# Patient Record
(demographics unavailable — no encounter records)

---

## 2025-01-09 NOTE — PROCEDURE
[FreeTextEntry1] : Plan:  Examination.  (Yr=[id number]).  We had a lengthy discussion concerning the patient's condition.  Surgical debride the affected skin area. Lotion was applied to the affected areas and is to be applied daily.  Fungal nails were debrided using manual cutters and electrical  to patient tolerance. (Oc=[id number]).  We discussed diabetic precautions. Patient to return: 3 months

## 2025-01-09 NOTE — HISTORY OF PRESENT ILLNESS
[FreeTextEntry1] : True is a 63-year-old male who presents to our office this morning for continued diabetic foot care.  He complains of thick, hard, dry, scaly skin, and toenails that are difficult to cut. The patient is a diabetic.  He is doing much better.  His last A1C was 6.9.

## 2025-01-09 NOTE — REVIEW OF SYSTEMS
[Negative] : Constitutional [de-identified] : dry skin, thick toenails [de-identified] : neuropathy

## 2025-01-09 NOTE — PHYSICAL EXAM
[FreeTextEntry3] : Vascular Exam: DP Pulse (left): 1/4. DP Pulse (right): 1/4. PT Pulse (left): 1/4. PT Pulse (right): 1/4. Capillary Return - L: Instantaneous. Capillary Return - R: Instantaneous. Temperature Grad - L: Hot to Warm. Temperature Grad - R: Hot to Warm. [de-identified] : Orthopedic Exam: Patient presents with nonpainful bunion and hammertoe deformities. [FreeTextEntry1] : Neurological Exam:  Achilles reflex absent, Patella reflex absent, sharp dull absent, light touch/pressure absent, hot/cold absent, vibratory absent, [place]-Kristin hallux, 5th digit, 3rd met head, heel absent.

## 2025-05-22 NOTE — PHYSICAL EXAM
[General Appearance - Alert] : alert [General Appearance - Well Nourished] : well nourished [General Appearance - Well-Appearing] : healthy appearing [FreeTextEntry3] : Vascular Exam: DP Pulse (left): 1/4. DP Pulse (right): 1/4. PT Pulse (left): 1/4. PT Pulse (right): 1/4. Capillary Return - L: Instantaneous. Capillary Return - R: Instantaneous. Temperature Grad - L: Hot to Warm. Temperature Grad - R: Hot to Warm. [de-identified] : Orthopedic Exam: Patient presents with nonpainful bunion and hammertoe deformities. [FreeTextEntry1] : Neurological Exam:  Achilles reflex absent, Patella reflex absent, sharp dull absent, light touch/pressure absent, hot/cold absent, vibratory absent, [place]-Kristin hallux, 5th digit, 3rd met head, heel absent.

## 2025-05-22 NOTE — PROCEDURE
[FreeTextEntry1] : Plan:  Examination.  ([id number]=[id number]).  We had a lengthy discussion concerning the patient's conditions.  Debridement of the remaining portion of the nail on the 4th toe right.  Surgical debride the affected skin area. Lotion was applied to the affected areas and is to be applied daily.  Fungal nails were debrided using manual cutters and electrical  to patient tolerance. (Ym=[id number]).  We discussed diabetic precautions. Patient to return: 3 months

## 2025-05-22 NOTE — HISTORY OF PRESENT ILLNESS
[FreeTextEntry1] : True is a 63-year-old male who presents this morning for continued diabetic foot care.  He had a toenail crack and fall off.  He knew it was time to come in. He also has thick, hard, dry, scaly skin, and toenails that are difficult to cut. The patient is a diabetic.  His last A1C was 6.9.  His doctor told him his neuropathy is getting worse.  He admits to not applying lotion as much as he should.

## 2025-05-22 NOTE — REVIEW OF SYSTEMS
[Negative] : Musculoskeletal [de-identified] : dry skin, thick toenails [de-identified] : neuropathy

## 2025-05-22 NOTE — PHYSICAL EXAM
[General Appearance - Alert] : alert [General Appearance - Well Nourished] : well nourished [General Appearance - Well-Appearing] : healthy appearing [FreeTextEntry3] : Vascular Exam: DP Pulse (left): 1/4. DP Pulse (right): 1/4. PT Pulse (left): 1/4. PT Pulse (right): 1/4. Capillary Return - L: Instantaneous. Capillary Return - R: Instantaneous. Temperature Grad - L: Hot to Warm. Temperature Grad - R: Hot to Warm. [de-identified] : Orthopedic Exam: Patient presents with nonpainful bunion and hammertoe deformities. [FreeTextEntry1] : Neurological Exam:  Achilles reflex absent, Patella reflex absent, sharp dull absent, light touch/pressure absent, hot/cold absent, vibratory absent, [place]-Kristin hallux, 5th digit, 3rd met head, heel absent.

## 2025-05-22 NOTE — PROCEDURE
[FreeTextEntry1] : Plan:  Examination.  (Hx=[id number]).  We had a lengthy discussion concerning the patient's conditions.  Debridement of the remaining portion of the nail on the 4th toe right.  Surgical debride the affected skin area. Lotion was applied to the affected areas and is to be applied daily.  Fungal nails were debrided using manual cutters and electrical  to patient tolerance. ([id number]).  We discussed diabetic precautions. Patient to return: 3 months

## 2025-05-22 NOTE — REVIEW OF SYSTEMS
[Negative] : Musculoskeletal [de-identified] : dry skin, thick toenails [de-identified] : neuropathy